# Patient Record
Sex: FEMALE | Race: BLACK OR AFRICAN AMERICAN
[De-identification: names, ages, dates, MRNs, and addresses within clinical notes are randomized per-mention and may not be internally consistent; named-entity substitution may affect disease eponyms.]

---

## 2018-02-02 ENCOUNTER — HOSPITAL ENCOUNTER (INPATIENT)
Dept: HOSPITAL 72 - EMR | Age: 83
LOS: 2 days | Discharge: ANOTHER HOSPITAL | DRG: 871 | End: 2018-02-04
Payer: MEDICARE

## 2018-02-02 VITALS — SYSTOLIC BLOOD PRESSURE: 147 MMHG | DIASTOLIC BLOOD PRESSURE: 81 MMHG

## 2018-02-02 VITALS — DIASTOLIC BLOOD PRESSURE: 75 MMHG | SYSTOLIC BLOOD PRESSURE: 135 MMHG

## 2018-02-02 VITALS — DIASTOLIC BLOOD PRESSURE: 55 MMHG | SYSTOLIC BLOOD PRESSURE: 103 MMHG

## 2018-02-02 VITALS — SYSTOLIC BLOOD PRESSURE: 97 MMHG | DIASTOLIC BLOOD PRESSURE: 59 MMHG

## 2018-02-02 VITALS — WEIGHT: 150 LBS | HEIGHT: 64 IN | BODY MASS INDEX: 25.61 KG/M2

## 2018-02-02 DIAGNOSIS — N39.0: ICD-10-CM

## 2018-02-02 DIAGNOSIS — A41.89: Primary | ICD-10-CM

## 2018-02-02 DIAGNOSIS — F03.90: ICD-10-CM

## 2018-02-02 DIAGNOSIS — Z43.1: ICD-10-CM

## 2018-02-02 DIAGNOSIS — N26.1: ICD-10-CM

## 2018-02-02 DIAGNOSIS — B96.1: ICD-10-CM

## 2018-02-02 DIAGNOSIS — N31.9: ICD-10-CM

## 2018-02-02 DIAGNOSIS — G20: ICD-10-CM

## 2018-02-02 DIAGNOSIS — R65.20: ICD-10-CM

## 2018-02-02 DIAGNOSIS — Z16.12: ICD-10-CM

## 2018-02-02 DIAGNOSIS — R31.0: ICD-10-CM

## 2018-02-02 DIAGNOSIS — J44.9: ICD-10-CM

## 2018-02-02 DIAGNOSIS — I48.0: ICD-10-CM

## 2018-02-02 DIAGNOSIS — Z16.39: ICD-10-CM

## 2018-02-02 DIAGNOSIS — R32: ICD-10-CM

## 2018-02-02 DIAGNOSIS — E43: ICD-10-CM

## 2018-02-02 DIAGNOSIS — Z43.2: ICD-10-CM

## 2018-02-02 DIAGNOSIS — I25.10: ICD-10-CM

## 2018-02-02 DIAGNOSIS — N20.0: ICD-10-CM

## 2018-02-02 LAB
ADD MANUAL DIFF: NO
ALBUMIN SERPL-MCNC: 2.6 G/DL (ref 3.4–5)
ALBUMIN/GLOB SERPL: 0.4 {RATIO} (ref 1–2.7)
ALP SERPL-CCNC: 107 U/L (ref 46–116)
ALT SERPL-CCNC: 19 U/L (ref 12–78)
ANION GAP SERPL CALC-SCNC: 12 MMOL/L (ref 5–15)
APPEARANCE UR: (no result)
APTT BLD: 29 SEC (ref 23–33)
APTT PPP: (no result) S
AST SERPL-CCNC: 50 U/L (ref 15–37)
BASOPHILS NFR BLD AUTO: 0.3 % (ref 0–2)
BILIRUB SERPL-MCNC: 0.8 MG/DL (ref 0.2–1)
BUN SERPL-MCNC: 26 MG/DL (ref 7–18)
CALCIUM SERPL-MCNC: 10.2 MG/DL (ref 8.5–10.1)
CHLORIDE SERPL-SCNC: 103 MMOL/L (ref 98–107)
CO2 SERPL-SCNC: 25 MMOL/L (ref 21–32)
CREAT SERPL-MCNC: 1.4 MG/DL (ref 0.55–1.3)
EOSINOPHIL NFR BLD AUTO: 0 % (ref 0–3)
ERYTHROCYTE [DISTWIDTH] IN BLOOD BY AUTOMATED COUNT: 13.3 % (ref 11.6–14.8)
GLOBULIN SER-MCNC: 6 G/DL
GLUCOSE UR STRIP-MCNC: NEGATIVE MG/DL
HCT VFR BLD CALC: 43 % (ref 37–47)
HGB BLD-MCNC: 13.3 G/DL (ref 12–16)
INR PPP: 1.2 (ref 0.9–1.1)
KETONES UR QL STRIP: (no result)
LEUKOCYTE ESTERASE UR QL STRIP: (no result)
LYMPHOCYTES NFR BLD AUTO: 1.9 % (ref 20–45)
MCV RBC AUTO: 93 FL (ref 80–99)
MONOCYTES NFR BLD AUTO: 1.5 % (ref 1–10)
NEUTROPHILS NFR BLD AUTO: 96.2 % (ref 45–75)
NITRITE UR QL STRIP: NEGATIVE
PH UR STRIP: 7 [PH] (ref 4.5–8)
PLATELET # BLD: 200 K/UL (ref 150–450)
POTASSIUM SERPL-SCNC: 4.5 MMOL/L (ref 3.5–5.1)
PROT UR QL STRIP: (no result)
RBC # BLD AUTO: 4.6 M/UL (ref 4.2–5.4)
SODIUM SERPL-SCNC: 140 MMOL/L (ref 136–145)
SP GR UR STRIP: 1.01 (ref 1–1.03)
UROBILINOGEN UR-MCNC: NORMAL MG/DL (ref 0–1)
WBC # BLD AUTO: 19.1 K/UL (ref 4.8–10.8)

## 2018-02-02 PROCEDURE — 87081 CULTURE SCREEN ONLY: CPT

## 2018-02-02 PROCEDURE — 85025 COMPLETE CBC W/AUTO DIFF WBC: CPT

## 2018-02-02 PROCEDURE — 99291 CRITICAL CARE FIRST HOUR: CPT

## 2018-02-02 PROCEDURE — 93005 ELECTROCARDIOGRAM TRACING: CPT

## 2018-02-02 PROCEDURE — 85730 THROMBOPLASTIN TIME PARTIAL: CPT

## 2018-02-02 PROCEDURE — 76775 US EXAM ABDO BACK WALL LIM: CPT

## 2018-02-02 PROCEDURE — 86900 BLOOD TYPING SEROLOGIC ABO: CPT

## 2018-02-02 PROCEDURE — 71045 X-RAY EXAM CHEST 1 VIEW: CPT

## 2018-02-02 PROCEDURE — 80048 BASIC METABOLIC PNL TOTAL CA: CPT

## 2018-02-02 PROCEDURE — 87086 URINE CULTURE/COLONY COUNT: CPT

## 2018-02-02 PROCEDURE — 87040 BLOOD CULTURE FOR BACTERIA: CPT

## 2018-02-02 PROCEDURE — 85007 BL SMEAR W/DIFF WBC COUNT: CPT

## 2018-02-02 PROCEDURE — 84484 ASSAY OF TROPONIN QUANT: CPT

## 2018-02-02 PROCEDURE — 85610 PROTHROMBIN TIME: CPT

## 2018-02-02 PROCEDURE — 87181 SC STD AGAR DILUTION PER AGT: CPT

## 2018-02-02 PROCEDURE — 86901 BLOOD TYPING SEROLOGIC RH(D): CPT

## 2018-02-02 PROCEDURE — 83880 ASSAY OF NATRIURETIC PEPTIDE: CPT

## 2018-02-02 PROCEDURE — 83605 ASSAY OF LACTIC ACID: CPT

## 2018-02-02 PROCEDURE — 80053 COMPREHEN METABOLIC PANEL: CPT

## 2018-02-02 PROCEDURE — 36415 COLL VENOUS BLD VENIPUNCTURE: CPT

## 2018-02-02 PROCEDURE — 86850 RBC ANTIBODY SCREEN: CPT

## 2018-02-02 PROCEDURE — 81003 URINALYSIS AUTO W/O SCOPE: CPT

## 2018-02-02 NOTE — EMERGENCY ROOM REPORT
History of Present Illness


General


Chief Complaint:  General Complaint


Source:  Patient





Present Illness


HPI


84-year-old female, coming from nursing home, history of COPD, paroxysmal 

atrial fibrillation, Parkinson's disease, CHF, CAD, G2, colostomy, left-sided 

nephrostomy (for apparent large left renal calculi), presenting with low blood 

pressure and blood coming from left nephrostomy.  Upon medical records, the 

patient was recently discharged from outside hospital on January 30.  Patient 

was admitted for nephrostomy tube dislodgment.  She had it replaced.


Patient had borderline blood pressure, afebrile.  Patient is awake alert, 

however poor historian


Allergies:  


Coded Allergies:  


     No Known Allergies (Verified , 7/14/08)





Patient History


Past Medical History:  see triage record


Past Surgical History:  none


Pertinent Family History:  none


Reviewed Nursing Documentation:  PMH: Agreed, PSxH: Agreed





Nursing Documentation-PMH


Past Medical History:  No History, Except For


Hx Cardiac Problems:  Yes - AFIB


Hx Hypertension:  Yes - HYPERLIPIDEMIA


Hx Pacemaker:  Yes


Hx Asthma:  No - SEPSIS


Hx Cancer:  No


Hx Gastrointestinal Problems:  No - GERD


Hx Neurological Problems:  Yes - hx of dementia


Hx Cerebrovascular Accident:  No


Hx Dementia:  Yes


Hx Parkinson's Disease:  Yes


Hx Syncope:  Yes


Hx Weakness:  Yes





Review of Systems


All Other Systems:  limited





Physical Exam





Vital Signs








  Date Time  Temp Pulse Resp B/P (MAP) Pulse Ox O2 Delivery O2 Flow Rate FiO2


 


2/2/18 16:00 97.0 120 18 105/70 97 Room Air  








Sp02 EP Interpretation:  reviewed, normal


General Appearance:  other - Elderly, chronically ill patient, awake and alert, 

currently does not appear to be in pain or acute distress


Head:  normocephalic, atraumatic


Eyes:  bilateral eye normal inspection, bilateral eye PERRL, bilateral eye EOMI


ENT:  normal ENT inspection, normal pharynx, normal voice, moist mucus membranes


Neck:  normal inspection, full range of motion, supple


Respiratory:  normal inspection, lungs clear, normal breath sounds, no 

respiratory distress, no retraction, no wheezing, speaking full sentences, 

chest symmetrical


Cardiovascular #1:  normal inspection, regular rate, rhythm, normal capillary 

refill


Cardiovascular #2:  2+ radial (R), 2+ radial (L)


Gastrointestinal:  other - G-tube in place, right-sided colostomy, left-sided 

nephrostomy draining gross hematuria


Musculoskeletal:  normal inspection, back normal, normal range of motion, non-

tender


Neurologic:  alert, responsive, motor strength/tone normal, sensory intact, 

speech normal


Skin:  normal inspection, normal color, no rash, warm/dry, well hydrated, 

normal turgor





Procedures


Critical Care Time


Critical Care Time


40 minutes of CC time


84-year-old female, low blood pressure, hematuria


VS: Tachycardic, febrile, borderline hypotensive





Airway patent. 


Not hypoxic.


PLAN: IV access, labs, lactate, troponin, Blood/Urine Cx, Abx, IVF





Anticipate admission to Tele vs. [facility]


CC time also includes review of labs, review of EMR, discussion with family and 

paperwork from SNF, d/w hospitalist


CC could include dosing of pressors, additional Abx


CC time does not include procedures





Medical Decision Making


Diagnostic Impression:  


 Primary Impression:  


 UTI (urinary tract infection)


 Additional Impressions:  


 Hematuria


 Severe sepsis


ER Course


84-year-old female, bleeding from left-sided nephrostomy, also found to be 

febrile with a borderline blood pressure





DDX:


Sepsis 2/2 UTI, PNA, bacteremia


Left-sided nephrostomy tube complication





Plan:


Airway patent, not hypoxic


IV fluid for 30 mL per kg bolus will not be given due to patient having CHF


Obtain labs including cbc, bmp, blood culture, lactate, ua, ucx


CXR


EKG


Broad spectrum ABX





ER course:


Pt's airway remains patent, supplemental O2 provided by NC


Given broad spectrum abx - vancomycin and cefepime


BP improved with fluids


mental status remains awake and alert





Sepsis Re-examination


Time: 6 PM


VS: Temp 99  /75 RR 25


CVS: RRR


Respiratory: Lungs clear bilaterally


Peripheral pulses: 2+ radial


Capillary refill: <2 seconds


Skin exam: warm, dry, no rash, not mottled








Disposition: 


Patient will admitted to telemetry


Patient requires close monitoring of respiratory/hemodynamic status and 

continuation of IV antibiotics.


D/W Hospitalist SUKUMAR Burrell





Please note that this Emergency Department Report was dictated using Cardoc technology software, occasionally this can lead to 

erroneous entry secondary to interpretation by the dictation equipment.





EKG Diagnostic Results


EP Interpretation: Yes


Rate: Tachycardic


Rhythm: NSR


ST Segments: ST depressions notedII2 and aVF


ASA given to patient: No





Rhythm Strip


EP Interpretation: Yes


Rate: 121


Rhythm: NSR, no PVCs, no ectopy





Chest X-ray


CXR: Ordered: Yes


1 view


Indication: Chest pain


EP interpretation: Yes


Interpretation: No consolidation, no effusion, no PTX, no acute cardiopulmonary 

disease, gastric distention


Impression: Gastric distention





Electronically signed by Effie Goyal MD





Laboratory Tests








Test


  2/2/18


17:00 2/2/18


17:45


 


White Blood Count


  19.1 K/UL


(4.8-10.8)  H 


 


 


Red Blood Count


  4.60 M/UL


(4.20-5.40) 


 


 


Hemoglobin


  13.3 G/DL


(12.0-16.0) 


 


 


Hematocrit


  43.0 %


(37.0-47.0) 


 


 


Mean Corpuscular Volume 93 FL (80-99)   


 


Mean Corpuscular Hemoglobin


  29.0 PG


(27.0-31.0) 


 


 


Mean Corpuscular Hemoglobin


Concent 31.0 G/DL


(32.0-36.0)  L 


 


 


Red Cell Distribution Width


  13.3 %


(11.6-14.8) 


 


 


Platelet Count


  200 K/UL


(150-450) 


 


 


Mean Platelet Volume


  7.2 FL


(6.5-10.1) 


 


 


Neutrophils (%) (Auto)


  96.2 %


(45.0-75.0)  H 


 


 


Lymphocytes (%) (Auto)


  1.9 %


(20.0-45.0)  L 


 


 


Monocytes (%) (Auto)


  1.5 %


(1.0-10.0) 


 


 


Eosinophils (%) (Auto)


  0.0 %


(0.0-3.0) 


 


 


Basophils (%) (Auto)


  0.3 %


(0.0-2.0) 


 


 


Prothrombin Time


  12.7 SEC


(9.30-11.50)  H 


 


 


Prothrombin Time INR


  1.2 (0.9-1.1)


H 


 


 


PTT


  29 SEC (23-33)


  


 


 


Sodium Level


  140 MMOL/L


(136-145) 


 


 


Potassium Level


  4.5 MMOL/L


(3.5-5.1) 


 


 


Chloride Level


  103 MMOL/L


() 


 


 


Carbon Dioxide Level


  25 MMOL/L


(21-32) 


 


 


Anion Gap


  12 mmol/L


(5-15) 


 


 


Blood Urea Nitrogen


  26 mg/dL


(7-18)  H 


 


 


Creatinine


  1.4 MG/DL


(0.55-1.30)  H 


 


 


Estimate Glomerular


Filtration Rate  mL/min (>60)  


  


 


 


Glucose Level


  149 MG/DL


()  H 


 


 


Lactic Acid Level


  6.40 mmol/L


(0.66-2.22)  H 7.90 mmol/L


(0.66-2.22)  H


 


Calcium Level


  10.2 MG/DL


(8.5-10.1)  H 


 


 


Total Bilirubin


  0.8 MG/DL


(0.2-1.0) 


 


 


Aspartate Amino Transferase


(AST) 50 U/L (15-37)


H 


 


 


Alanine Aminotransferase (ALT)


  19 U/L (12-78)


  


 


 


Alkaline Phosphatase


  107 U/L


() 


 


 


Troponin I


  0.000 ng/mL


(0.000-0.056) 


 


 


Pro-B-Type Natriuretic Peptide


  718 pg/mL


(0-125)  H 


 


 


Total Protein


  8.6 G/DL


(6.4-8.2)  H 


 


 


Albumin


  2.6 G/DL


(3.4-5.0)  L 


 


 


Globulin 6.0 g/dL   


 


Albumin/Globulin Ratio


  0.4 (1.0-2.7)


L 


 


 


Urine Color  Red  


 


Urine Appearance  Turbid  


 


Urine pH  7 (4.5-8.0)  


 


Urine Specific Gravity


  


  1.010


(1.005-1.035)


 


Urine Protein


  


  4+ (NEGATIVE)


H


 


Urine Glucose (UA)


  


  Negative


(NEGATIVE)


 


Urine Ketones


  


  1+ (NEGATIVE)


H


 


Urine Occult Blood


  


  5+ (NEGATIVE)


H


 


Urine Nitrite


  


  Negative


(NEGATIVE)


 


Urine Bilirubin


  


  Negative


(NEGATIVE)


 


Urine Urobilinogen


  


  Normal MG/DL


(0.0-1.0)


 


Urine Leukocyte Esterase


  


  3+ (NEGATIVE)


H


 


Urine RBC


  


  Tntc /HPF (0 -


2)  H


 


Urine WBC


  


  Tntc /HPF (0 -


2)  H


 


Urine Squamous Epithelial


Cells 


  None /LPF


(NONE/OCC)


 


Urine Bacteria


  


  Moderate /HPF


(NONE)  H











Last Vital Signs








  Date Time  Temp Pulse Resp B/P (MAP) Pulse Ox O2 Delivery O2 Flow Rate FiO2


 


2/2/18 16:00 97.0 120 18 105/70 97 Room Air  








Disposition:  ADMITTED AS INPATIENT


Condition:  Effie Panchal M.D. Feb 2, 2018 16:34

## 2018-02-02 NOTE — DIAGNOSTIC IMAGING REPORT
Indication: Chest pain

 

Comparison:  12/30/2015

 

A single view chest radiograph was obtained.

 

Findings:

 

Heart size is normal. Pulmonary vascularity is within normal limits. Exam limited by

rotation. Focus of a mediastinal calcification may be related to old granulomatous

disease. This is not for certain. The aorta is diffusely ectatic. Bones are

osteopenic. Lung volumes are low bilaterally.

 

IMPRESSION:

 

No change from the prior study.

 

No infiltrate or other acute disease identified.

 

Cardiomegaly and atherosclerotic disease of the aorta.

 

Osteoporosis

## 2018-02-03 VITALS — SYSTOLIC BLOOD PRESSURE: 99 MMHG | DIASTOLIC BLOOD PRESSURE: 44 MMHG

## 2018-02-03 VITALS — DIASTOLIC BLOOD PRESSURE: 61 MMHG | SYSTOLIC BLOOD PRESSURE: 109 MMHG

## 2018-02-03 VITALS — DIASTOLIC BLOOD PRESSURE: 91 MMHG | SYSTOLIC BLOOD PRESSURE: 140 MMHG

## 2018-02-03 VITALS — SYSTOLIC BLOOD PRESSURE: 100 MMHG | DIASTOLIC BLOOD PRESSURE: 58 MMHG

## 2018-02-03 VITALS — DIASTOLIC BLOOD PRESSURE: 80 MMHG | SYSTOLIC BLOOD PRESSURE: 100 MMHG

## 2018-02-03 VITALS — DIASTOLIC BLOOD PRESSURE: 66 MMHG | SYSTOLIC BLOOD PRESSURE: 105 MMHG

## 2018-02-03 LAB
ADD MANUAL DIFF: YES
ANION GAP SERPL CALC-SCNC: 9 MMOL/L (ref 5–15)
BUN SERPL-MCNC: 24 MG/DL (ref 7–18)
CALCIUM SERPL-MCNC: 9.3 MG/DL (ref 8.5–10.1)
CHLORIDE SERPL-SCNC: 109 MMOL/L (ref 98–107)
CO2 SERPL-SCNC: 25 MMOL/L (ref 21–32)
CREAT SERPL-MCNC: 1.3 MG/DL (ref 0.55–1.3)
ERYTHROCYTE [DISTWIDTH] IN BLOOD BY AUTOMATED COUNT: 13 % (ref 11.6–14.8)
HCT VFR BLD CALC: 30.9 % (ref 37–47)
HGB BLD-MCNC: 9.9 G/DL (ref 12–16)
MCV RBC AUTO: 92 FL (ref 80–99)
PLATELET # BLD: 148 K/UL (ref 150–450)
POTASSIUM SERPL-SCNC: 4.5 MMOL/L (ref 3.5–5.1)
RBC # BLD AUTO: 3.36 M/UL (ref 4.2–5.4)
SODIUM SERPL-SCNC: 142 MMOL/L (ref 136–145)
WBC # BLD AUTO: 23.5 K/UL (ref 4.8–10.8)

## 2018-02-03 RX ADMIN — MEROPENEM SCH MLS/HR: 500 INJECTION INTRAVENOUS at 21:01

## 2018-02-03 RX ADMIN — MEROPENEM SCH MLS/HR: 500 INJECTION INTRAVENOUS at 09:26

## 2018-02-03 NOTE — DIAGNOSTIC IMAGING REPORT
Indication: Acute renal failure

 

Technique: Grayscale and duplex images of the kidneys, retroperitoneum, and bladder

were obtained.

 

Comparison: 4/21/2012            

 

Findings: Right kidney measures 9.4 cm in length. Left kidney measures 8.7 cm in

length. Both kidneys demonstrate normal echogenicity. No hydronephrosis.  6 a

calcification is seen in the lower pole of the left kidney. This was not definitely

demonstrated previously. Normal inferior vena cava. Bladder is normal, nondistended.

 

Impression: Negative for hydronephrosis

 

Nonobstructive left lower pole intrarenal calculus.

## 2018-02-03 NOTE — CARDIOLOGY REPORT
--------------- APPROVED REPORT --------------





EKG Measurement

Heart [id number]

AL [id number]

[id number]

[id number]

[id number]





Sinus tachyarrhythmia with PAC's

Increased R/S ratio in V1, consider early transition or posterior infarct

T wave abnormality, consider inferior ischemia

Abnormal ECG

## 2018-02-03 NOTE — HISTORY & PHYSICAL
History and Physical


History & Physicial


[id number]





hematuria


replaced dislodged left nephrostomy tube at [facility] dc 1/31/18


recurrent uti on meropenem upon admission


sepsis


pd


cad


hx of ileostomy and Lorenza pouch


pCM prior to admission


GT 


RI











GALLITO PALMER DO Feb 3, 2018 08:34

## 2018-02-04 VITALS — SYSTOLIC BLOOD PRESSURE: 115 MMHG | DIASTOLIC BLOOD PRESSURE: 48 MMHG

## 2018-02-04 NOTE — CONSULTATION
DATE OF CONSULTATION:  02/03/2018



REFERRING PHYSICIAN:  Lee Burrell M.D.



CONSULTING PHYSICIAN:  Efraín Kay M.D.



REASON FOR CONSULTATION:  Evaluation of _____.



HISTORY OF PRESENT ILLNESS:  This is an 84-year-old female.  She has a

history of nephrolithiasis, history of hydronephrosis, history of left

nephrostomy and the nephrostomy was recently exchanged at [facility].  She was

brought back because of hematuria and UTI.  I am unable to get any history

from the patient.  Most of the history was obtained from the chart.



PAST MEDICAL HISTORY:  Significant for above, again history of

nephrolithiasis, history of coronary artery disease, and history of

dementia.



PAST SURGICAL HISTORY:  Ileostomy and Lorenza pouch.



MEDICATIONS:  Current mediation list was reviewed.  She is on meropenem and

vancomycin.



ALLERGIES:  No known drug allergies.



SOCIAL HISTORY:  She is a resident of nursing home.



FAMILY HISTORY:  Unable to obtain.



REVIEW OF SYSTEMS:  Unable to obtain.



PHYSICAL EXAMINATION:

GENERAL:  Elderly female, no acute distress.

VITAL SIGNS:  Temperature is 96.9 degrees and blood pressure is 99/44.

NECK:  Supple.

ABDOMEN:  Soft.

GENITOURINARY:  There is a left nephrostomy.  Urine is dark bloody.

EXTREMITIES:  No clubbing.



LABORATORY AND DIAGNOSTIC DATA:  UA showed too numerous to count RBCs, too

numerous to count WBCs, moderate bacteria, and 4+ protein.  White count is

23.5, hemoglobin 9.9 and hemoglobin was 13.3 yesterday and platelets are

148.  BUN is 24 and creatinine is 1.3.



Diagnostic imaging studies, the patient had a chest x-ray, which was

reviewed.  There is no recent renal imaging here.  She did have a CT scan

of the abdomen and pelvis back in 2014 and at that time, there was mention

of mild to moderate left hydronephrosis, ureteral calculus, nonobstructive

calculi, some mild bilateral renal cortical thinning.



IMPRESSION:

1. Gross hematuria.

2. Hydronephrosis history.

3. Nephrolithiasis.

4. Urinary tract infection.

5. Proteinuria.

6. Urinary incontinence by report.

7. Neurogenic bladder.

8. Renal atrophy.



PLAN AND DISCUSSION:  The patient has a nephrostomy tube.  The urine

draining is bloody _____ dark.  I do not see active bleeding.  I would

personally hand irrigated the nephrostomy, its position is good and no

clots.  I will recommend to monitor with antibiotics as ordered.

Hemoglobin and hematocrit will be monitored.  Further recommendations

pending.



Thank you for this consultation.









  ______________________________________________

  Efraín Kay M.D.





DR:  BERNA

D:  02/03/2018 11:44

T:  02/03/2018 17:35

JOB#:  [id number]

CC:

## 2018-02-04 NOTE — HISTORY AND PHYSICAL REPORT
DATE OF ADMISSION:  02/02/2018



REASON FOR ADMISSION:  Hematuria.



HISTORY OF PRESENT ILLNESS:  This is an elderly female who was recently

discharged from [facility] on 01/31/2018 after dislodged

left nephrostomy tube that was replaced by Interventional Radiology.  She

has had persistent hematuria and was brought to the emergency room for

further evaluation.  She apparently had hypotension and significant

bleeding.  No reports of nausea, vomiting, or diarrhea.  The patient is a

poor historian.



The patient did come in on IV meropenem per her MAR from the nursing

home.  Chest x-ray in the emergency room was negative.



PAST MEDICAL HISTORY:  Paroxysmal atrial fibrillation, COPD, Parkinson

disease, CHF, coronary artery disease, colostomy, left side nephrostomy

tube, apparently placed for large left renal calculi and hydronephrosis,

and protein-calorie malnutrition.



SURGICAL HISTORY:  Nephrostomy tube on the left, PEG placement, ileostomy

in the right lower quadrant.



MEDICATIONS:  Prehospital medications reviewed, reconciled, and documented

in the electronic medical record by dose, frequency, and route.



ALLERGIES:  None.



SOCIAL HISTORY:  Negative for tobacco, alcohol, or drugs.



FAMILY HISTORY:  Unreliable from the patient.



REVIEW OF SYSTEMS:  Unreliable as well.



PHYSICAL EXAMINATION:

GENERAL:  At the time of my exam, she is alert, she is oriented to

person.

VITAL SIGNS:  At the time of my exam, she is afebrile, pulse is 82, blood

pressure 109/61.  She is 97% on 2 L.

HEENT:  She is normocephalic and atraumatic.  Oropharynx is moist.  She is

edentulous.

NECK:  Supple without any stiffness or lymphadenopathy.

LUNGS:  Decreased at the bases.  No wheeze present.

HEART:  Regular rate and rhythm without murmur.

ABDOMEN:  Soft, nontender.  Ileostomy is present.  G-tube site is

present.

EXTREMITIES:  With trace lower extremity edema.  Moves all extremities

spontaneously.  The patient is nonambulatory at baseline.



LABORATORY VALUES:  White count is 19.1, hemoglobin of 13.3, and platelets

are 200,000.  Her sodium is 140, potassium 4.5, chloride _____,

bicarbonate 25, BUN 26, creatinine 1.4, and glucose is 149.  Her lactates

were serially drawn at 6.4, 7.9, and 5.5. Calcium is elevated at 10.2.

Troponin was negative.  NP mildly elevated at 718.  Her urinalysis was

positive for leukocytes and heme. Her INR was 1.2.



ASSESSMENT AND PLAN:

1. Gross hematuria.

2. Recent nephrostomy tube dislodgement, status post replacement by

Interventional Radiology at [facility] and discharged on

01/31/2018.  Nephrostomy tube is secondary to large left renal calculi and

has been in place since 06/06/2017.

3. Recurrent urinary tract infections, on meropenem prior to admission.

No reports of the sensitivities of urinalysis performed at [facility] prior to discharge.

4. History of renal insufficiency.

5. Hypernatremia.

6. Protein-calorie malnutrition, status post colectomy and Lorenza

pouch ileostomy, currently on tube feedings.

7. Parkinson disease.

8. Coronary artery disease and paroxysmal atrial fibrillation, currently

not on anticoagulation due to recurrent hematuria, as risks of

anticoagulation outweighed benefits, per the primary care physician.



Plan for the patient, Urology will see the patient.  Dr. Kay has

been called.  H and H is pending this morning.  IV antibiotics have been

broadened to include vancomycin in light of white count, on meropenem.

P.r.n. nebulizers, G-tube feeds and G-tube feeding.  Aspiration

precautions.  We will transfuse if hemoglobin less than 8 with active

bleeding and we will obtain a renal ultrasound and defer further

management of hematuria to Dr. Kay who will see the patient this

morning.









  ______________________________________________

  Christi Cochran D.O. DR:  SK/as

D:  02/03/2018 08:33

T:  02/03/2018 15:07

JOB#:  [id number]

CC:

## 2018-02-07 NOTE — DISCHARGE SUMMARY
Discharge Summary


Hospital Course


Date of Admission


Feb 2, 2018 at 17:29


Date of Discharge


Feb 4, 2018 at 04:45


Admitting Diagnosis


sepsis


HPI


Jonathan Holman is a 84 year old female who was admitted on Feb 2, 2018 at 17:

29 for Sepsis


Hospital Course


dc summary #[id number]





Discharge


Discharge Disposition


Patient was discharged to [facility]


Discharge Diagnoses:  





Discharge Instructions


Discharge Instructions


Special Instructions


I have been assigned to complete a D/C Summary on this account. I was not 

involved in the patient management











Karina Morillo NP (Vanchtein) Feb 7, 2018 12:46

## 2018-02-08 NOTE — DISCHARGE SUMMARY 2 SIG
DATE OF ADMISSION:  02/02/2018



DATE OF DISCHARGE:  02/04/2018



REASON FOR ADMISSION:  84-year-old female with a history

of COPD, paroxysmal atrial fibrillation, Parkinson disease, CHF, coronary

artery disease, colostomy, left nephrostomy, secondary to nephrolithiasis

with a history of colectomy and Lorenza's pouch ileostomy, currently on

tube feeding, presented to the emergency department for evaluation due 

to the borderline blood pressure and bleeding from left nephrostomy tube.

The patient had a recent replacement of nephrostomy tube due to the 

dislodgement at [facility]. The patient was hypotensive 

-97/59, febrile - 101.2, tachycardic - 102.  Lactic acid - 6.4.  Troponin

was  negative.  Pro BNP -718.  Urinalysis with evidence  of UTI.  WBC -19.1. 

The patient admitted with diagnoses of sepsis, UTI, and hematuria.  



HOSPITAL COURSE:  The patient admitted.  Urology consult was requested.

The patient started on empiric antibiotics and IV fluids.  Urology seen

and evaluated the patient.  Nephrostomy tube was draining  dark blood tinged 
urine.  

However, no active bleeding was seen.  Urologist personally irrigated the 
nephrostomy 

tube and confirmed good position and no clots. He recommended to monitor and 

continue antibiotics for urinary tract infection.  



The patient was on empiric antibiotics. Next day, leukocytosis trending up- 
23.5.  

Fever resolved.  Tachycardia with some improvement.  Blood culture revealed 

Klebsiella pneumoniae ESBL high grade as well as a Proteus mirabilis.  Urine 
culture

revealed Klebsiella pneumoniae, carbapenem resistant, Providencia, and E. Coli.
  

Hemoglobin was trending down next day- 9.9, hematocrit- 30.9.  Hematuria 
improved.  

Hemoglobin and hematocrit were closely monitored with goal to keep hemoglobin 
above 8.  

Renal ultrasound revealed evidence of nonobstructive left lower pole intrarenal 
calculus, 

but was negative for hydronephrosis and showed normal echogenicity of bilateral 
kidneys.

Patient with history of paroxysmal atrial fibrillation. Currently not on 
anticoagulation due

to recurrent hematuria, risk outweigh the benefits.



Family requested to transfer the patient to [facility] where 
her usual 

doctor was.  The patient was placed on waiting list, bed was available and the 
patient

subsequently was transferred.  

The patient was stable for transfer to [facility] for further

management.



FINAL DIAGNOSES:   



1. Severe sepsis with  Klebsiella pneumoniae ESBL and Proteus bacteremia.  

2. Polymicrobial UTI: Carbapenem-resistant Klebsiella pneumonia/Providencia/E 
coli  

urinary tract infection with history of recurrent urinary tract infections.

3. Gross hematuria.

4. Severe protein-calorie malnutrition.

5. History of renal insufficiency.

6. Neurogenic bladder.

7. Nephrolithiasis.

8. Parkinson disease.

9. Coronary artery disease.

10. Paroxysmal atrial fibrillation

11. Status post colectomy and Lorenza's pouch ileostomy,  on tube

feedings.

12. Strict aspiration precautions maintained.  The patient able to

tolerate tube feeding.





DISCHARGE MEDICATIONS:  List of medication was sent to accepting

facility.



DISCHARGE INSTRUCTIONS:  The patient was discharged to [facility].  Follow up with medical doctor at the facility.







  ______________________________________________

  Lee Burrell M.D.



I have been assigned to dictate discharge summary on this account and I

was not involved in the patient's management.



  ______________________________________________

  Karina Staleysanket N.PGiovana





DR:  JASMYN/PM

D:  02/07/2018 12:44

T:  02/08/2018 04:58

JOB#:  [id number]

CC:



NARGIS